# Patient Record
(demographics unavailable — no encounter records)

---

## 2025-03-18 NOTE — HISTORY OF PRESENT ILLNESS
[FreeTextEntry1] : MADISON ALFARO is a 55 year M presenting on 03/18/2025 PMH: BPH, ED  1 yr F/U  BPH: Nocturia x1. Small amount post void dribbling. No bother. Did not try alfuzosin PVR to r/o retention: 2 mL  ED: trial sidenafil 100mg gives heartburn tadalafil 20mg better. Cause red eyes  No FHX prostate cancer  PSA 1.09, 4/2024 0.82, 3/2023 1.16, 9/2020 0.95, 2/2019 0.85, 2/2018

## 2025-03-18 NOTE — PHYSICAL EXAM
[Normal Appearance] : normal appearance [Well Groomed] : well groomed [General Appearance - In No Acute Distress] : no acute distress [Respiration, Rhythm And Depth] : normal respiratory rhythm and effort [Exaggerated Use Of Accessory Muscles For Inspiration] : no accessory muscle use [Urethral Meatus] : meatus normal [Penis Abnormality] : normal circumcised penis [Scrotum] : the scrotum was normal [Testes Tenderness] : no tenderness of the testes [Testes Mass (___cm)] : there were no testicular masses [Prostate Tenderness] : the prostate was not tender [No Prostate Nodules] : no prostate nodules [Prostate Size ___ gm] : prostate size [unfilled] gm [Normal Station and Gait] : the gait and station were normal for the patient's age [] : no rash [No Focal Deficits] : no focal deficits [Oriented To Time, Place, And Person] : oriented to person, place, and time [Affect] : the affect was normal [Mood] : the mood was normal [de-identified] : B/L 22cc testes, no masses. Prostate soft, smooth